# Patient Record
Sex: MALE
[De-identification: names, ages, dates, MRNs, and addresses within clinical notes are randomized per-mention and may not be internally consistent; named-entity substitution may affect disease eponyms.]

---

## 2020-04-26 ENCOUNTER — MESSAGE (OUTPATIENT)
Age: 75
End: 2020-04-26

## 2021-09-07 PROBLEM — Z00.00 ENCOUNTER FOR PREVENTIVE HEALTH EXAMINATION: Status: ACTIVE | Noted: 2021-09-07

## 2024-08-08 ENCOUNTER — APPOINTMENT (OUTPATIENT)
Dept: ORTHOPEDIC SURGERY | Facility: CLINIC | Age: 79
End: 2024-08-08

## 2024-08-08 ENCOUNTER — OUTPATIENT (OUTPATIENT)
Dept: OUTPATIENT SERVICES | Facility: HOSPITAL | Age: 79
LOS: 1 days | End: 2024-08-08
Payer: MEDICARE

## 2024-08-08 ENCOUNTER — RESULT REVIEW (OUTPATIENT)
Age: 79
End: 2024-08-08

## 2024-08-08 PROBLEM — Z78.9 SOCIAL ALCOHOL USE: Status: ACTIVE | Noted: 2024-08-08

## 2024-08-08 PROBLEM — Z78.9 NON-SMOKER: Status: ACTIVE | Noted: 2024-08-08

## 2024-08-08 PROCEDURE — 73564 X-RAY EXAM KNEE 4 OR MORE: CPT | Mod: 26,50

## 2024-08-08 PROCEDURE — 73564 X-RAY EXAM KNEE 4 OR MORE: CPT

## 2024-08-08 PROCEDURE — 99203 OFFICE O/P NEW LOW 30 MIN: CPT

## 2024-08-09 PROBLEM — M17.12 OSTEOARTHRITIS OF LEFT PATELLOFEMORAL JOINT: Status: ACTIVE | Noted: 2024-08-09

## 2024-08-09 NOTE — END OF VISIT
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 He personally directed, reviewed, and agreed with the chart.All medical record  entries made by SHONA Healy, acting as a scribe for this encounter under the direction of Norberto Hart MD

## 2024-08-09 NOTE — PHYSICAL EXAM
[de-identified] : The patient is a well developed, well nourished male in no apparent distress. He is alert and oriented X 3 with a pleasant mood and appropriate affect. On physical examination of the left knee, his ROM is 0-120 degrees. The patient walks with a normal gait and stands in neutral alignment. There is no effusion. No warmth or erythema is noted. The patella is tender to palpation medially and laterally. There is patellofemoral crepitus noted. The apprehension and grind tests are negative. The extensor mechanism is intact. There is no joint line tenderness. The Magnus sign is absent. The Lachman and pivot shift tests are negative. There is no varus or valgus laxity at 0 or 30 degrees. No posterolateral or anteromedial laxity is noted. No masses are palpable. No other soft tissue or bony tenderness is noted. There is some tenderness noted on palpation of the IT band. Quadriceps weakness is noted. Neurovascular function is intact.  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

## 2024-08-09 NOTE — DISCUSSION/SUMMARY
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 he is also dealing with sciatica. He will consider treatment for his sciatica and we will then consider HA injections for his knee in the future. All questions were answered. he understands that he may ultimately require knee replacement in the future. He will call if any issues arise

## 2024-08-09 NOTE — HISTORY OF PRESENT ILLNESS
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 He has cut back on exercise due to both knee and low back issues. He saw Dr Davila for sciatica several years ago. He had symptomatic improvement with epidural injections. He now reports worsening back pain and anterior knee pain. he has ongoing anterior knee clicking and stiffness. He has pain with stairs and difficulty rising from a seated position. He denies any locking or buckling.

## 2024-08-09 NOTE — DISCUSSION/SUMMARY
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 he is also dealing with sciatica. He will consider treatment for his sciatica and we will then consider HA injections for his knee in the future. All questions were answered. he understands that he may ultimately require knee replacement in the future. He will call if any issues arise

## 2024-08-09 NOTE — DISCUSSION/SUMMARY
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 he is also dealing with sciatica. He will consider treatment for his sciatica and we will then consider HA injections for his knee in the future. All questions were answered. he understands that he may ultimately require knee replacement in the future. He will call if any issues arise

## 2024-08-09 NOTE — REVIEW OF SYSTEMS
[Joint Pain] : joint pain [Joint Stiffness] : joint stiffness [Joint Swelling] : joint swelling n/a/tylenol as needed for pain

## 2024-08-09 NOTE — HISTORY OF PRESENT ILLNESS
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 He has cut back on exercise due to both knee and low back issues. He saw Dr Davila for sciatica several years ago. He had symptomatic improvement with epidural injections. He now reports worsening back pain and anterior knee pain. he has ongoing anterior knee clicking and stiffness. He has pain with stairs and difficulty rising from a seated position. He denies any locking or buckling.

## 2024-08-09 NOTE — PHYSICAL EXAM
[de-identified] : The patient is a well developed, well nourished male in no apparent distress. He is alert and oriented X 3 with a pleasant mood and appropriate affect. On physical examination of the left knee, his ROM is 0-120 degrees. The patient walks with a normal gait and stands in neutral alignment. There is no effusion. No warmth or erythema is noted. The patella is tender to palpation medially and laterally. There is patellofemoral crepitus noted. The apprehension and grind tests are negative. The extensor mechanism is intact. There is no joint line tenderness. The Magnus sign is absent. The Lachman and pivot shift tests are negative. There is no varus or valgus laxity at 0 or 30 degrees. No posterolateral or anteromedial laxity is noted. No masses are palpable. No other soft tissue or bony tenderness is noted. There is some tenderness noted on palpation of the IT band. Quadriceps weakness is noted. Neurovascular function is intact.  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

## 2024-08-09 NOTE — HISTORY OF PRESENT ILLNESS
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 He has cut back on exercise due to both knee and low back issues. He saw Dr Davila for sciatica several years ago. He had symptomatic improvement with epidural injections. He now reports worsening back pain and anterior knee pain. he has ongoing anterior knee clicking and stiffness. He has pain with stairs and difficulty rising from a seated position. He denies any locking or buckling.

## 2024-08-09 NOTE — PHYSICAL EXAM
[de-identified] : The patient is a well developed, well nourished male in no apparent distress. He is alert and oriented X 3 with a pleasant mood and appropriate affect. On physical examination of the left knee, his ROM is 0-120 degrees. The patient walks with a normal gait and stands in neutral alignment. There is no effusion. No warmth or erythema is noted. The patella is tender to palpation medially and laterally. There is patellofemoral crepitus noted. The apprehension and grind tests are negative. The extensor mechanism is intact. There is no joint line tenderness. The Magnus sign is absent. The Lachman and pivot shift tests are negative. There is no varus or valgus laxity at 0 or 30 degrees. No posterolateral or anteromedial laxity is noted. No masses are palpable. No other soft tissue or bony tenderness is noted. There is some tenderness noted on palpation of the IT band. Quadriceps weakness is noted. Neurovascular function is intact.  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

## 2024-08-19 ENCOUNTER — APPOINTMENT (OUTPATIENT)
Dept: ORTHOPEDIC SURGERY | Facility: CLINIC | Age: 79
End: 2024-08-19
Payer: MEDICARE

## 2024-08-19 VITALS — HEART RATE: 72 BPM | WEIGHT: 160 LBS | BODY MASS INDEX: 22.4 KG/M2 | HEIGHT: 71 IN | OXYGEN SATURATION: 97 %

## 2024-08-19 DIAGNOSIS — Z87.19 PERSONAL HISTORY OF OTHER DISEASES OF THE DIGESTIVE SYSTEM: ICD-10-CM

## 2024-08-19 DIAGNOSIS — M17.0 BILATERAL PRIMARY OSTEOARTHRITIS OF KNEE: ICD-10-CM

## 2024-08-19 DIAGNOSIS — Z87.39 PERSONAL HISTORY OF OTHER DISEASES OF THE MUSCULOSKELETAL SYSTEM AND CONNECTIVE TISSUE: ICD-10-CM

## 2024-08-19 DIAGNOSIS — Z86.69 PERSONAL HISTORY OF OTHER DISEASES OF THE NERVOUS SYSTEM AND SENSE ORGANS: ICD-10-CM

## 2024-08-19 DIAGNOSIS — Z78.9 OTHER SPECIFIED HEALTH STATUS: ICD-10-CM

## 2024-08-19 DIAGNOSIS — E88.89 OTHER SPECIFIED METABOLIC DISORDERS: ICD-10-CM

## 2024-08-19 DIAGNOSIS — Z86.39 PERSONAL HISTORY OF OTHER ENDOCRINE, NUTRITIONAL AND METABOLIC DISEASE: ICD-10-CM

## 2024-08-19 DIAGNOSIS — Z86.79 PERSONAL HISTORY OF OTHER DISEASES OF THE CIRCULATORY SYSTEM: ICD-10-CM

## 2024-08-19 PROCEDURE — 99214 OFFICE O/P EST MOD 30 MIN: CPT

## 2024-08-19 NOTE — PHYSICAL EXAM
[de-identified] :   Gait: He walks with an antalgic gait  Inspection: Varus knee with no notable effusion  Palpation: Tenderness to palpation of the medial/patellar joint line  Range of Motion:         Right: 0-120                 Left: 5-100  Painful ROM left. bilateral knee stable to varus/valgus and ant/post stress  Both hips are stable no sign of dislocation or subluxation on exam with good pain free ROM.   [de-identified] :  I have reviewed X-rays of the bilateral knee which include (5 views; for bilateral views). They reveal moderate to severe degenerative arthritis with joint space narrowing and osteophyte formation and sclerosis. Bilateral knee varus deformity.

## 2024-08-19 NOTE — DISCUSSION/SUMMARY
[de-identified] : I was present with the Fellow during the key portions of the history and exam. I discussed the case with the Fellow and agree with the findings and plan as documented in the [facility] note, unless otherwise noted below.  Bilateral knee arthritis.  I had a long discussion with the patient regarding knee arthritis / degenerative disease and treatment options. We reviewed the nature and etiology of degenerative knee degenerative disease.  We discussed the spectrum of symptomatic treatments. Our discussion included the use of appropriate exercises, activity modifications, weight reduction and maintenance, appropriate medication use, use of assistive devices, role of injections and avoidance of high impact activities.  Given the clinical symptoms, physical exam and imaging findings, we discussed the possibility of knee replacement surgery.  We reviewed the elective quality of life nature of the procedure and the details of the surgery, approach and the implants used, using the model  in the clinic.  We discussed the recovery and risks as well.  At this point he is not completely maximized nonoperative treatments.  It is difficult to say if she has significant pain isolated to the knee consistently.  We provided a prescription for physical therapy.  He is going to work on that and other modalities and consider our discussions clued discussion regarding injections which we did discuss at length.  Will follow-up in 3 months or sooner if he wants to discuss further wants to consider injection or other intervention.  All questions answered.

## 2024-08-19 NOTE — REVIEW OF SYSTEMS
[Arthralgia] : arthralgia [Joint Pain] : joint pain [Joint Stiffness] : joint stiffness [Fever] : no fever [Chills] : no chills [Lower Ext Edema] : no lower extremity edema [SOB at rest] : no shortness of breath at rest [SOB on Exertion] : no shortness of breath on exertion

## 2024-08-19 NOTE — HISTORY OF PRESENT ILLNESS
[de-identified] :  KATALINA FERNANDESAN is a pleasant 79 year male . KATALINA LOPEZ complains of left knee pain over the past several year(s). Pain is located in the global region of the knee and does not radiate. He has a history of sciatica with some radiating pain from the back down to the foot. He denies prior injury. He notes the pain is worse with activity- jogging/taking stairs/getting in out of car and rates it 9 out of 10 at its worst. He is having difficulty taking stairs. He denies mechanical symptoms including catching, locking, buckling.  He does have rest/nighttime pain. He  reports difficulty taking public transportation due to the excessive stairs. He has been treated non-surgically with ice/heat, lifestyle modifications, and occasional tylenol. The left knee pain significantly interferes with their ADLs and quality of life. Denies any fevers and chills.

## 2024-08-19 NOTE — CONSULT LETTER
[FreeTextEntry2] : Dr. Hart [FreeTextEntry1] : I had the privilege of evaluating your patient today. I have enclosed my office note for your reference. If you have any questions, concerns or further input, please do not hesitate to contact me.  Thank you for allowing me to participate in the care of your patient.  Sincerely, Mikey Wilkinson MD